# Patient Record
Sex: MALE | Race: WHITE | Employment: OTHER | URBAN - METROPOLITAN AREA
[De-identification: names, ages, dates, MRNs, and addresses within clinical notes are randomized per-mention and may not be internally consistent; named-entity substitution may affect disease eponyms.]

---

## 2024-07-12 ENCOUNTER — APPOINTMENT (OUTPATIENT)
Age: 69
DRG: 065 | End: 2024-07-12
Payer: MEDICARE

## 2024-07-12 ENCOUNTER — HOSPITAL ENCOUNTER (INPATIENT)
Age: 69
LOS: 1 days | Discharge: HOME | DRG: 065 | End: 2024-07-13
Attending: EMERGENCY MEDICINE | Admitting: INTERNAL MEDICINE
Payer: MEDICARE

## 2024-07-12 DIAGNOSIS — I63.9 CEREBROVASCULAR ACCIDENT (CVA), UNSPECIFIED MECHANISM (HCC): ICD-10-CM

## 2024-07-12 DIAGNOSIS — R42 DIZZINESS: ICD-10-CM

## 2024-07-12 DIAGNOSIS — I63.9 CEREBELLAR STROKE, ACUTE (HCC): Primary | ICD-10-CM

## 2024-07-12 LAB
ALBUMIN SERPL-MCNC: 4.6 G/DL (ref 3.4–5)
ALBUMIN/GLOB SERPL: 1.7 {RATIO}
ALP SERPL-CCNC: 79 U/L (ref 40–129)
ALT SERPL-CCNC: 16 U/L (ref 10–40)
ANION GAP SERPL CALCULATED.3IONS-SCNC: 11 MMOL/L (ref 3–16)
AST SERPL-CCNC: 24 U/L (ref 15–37)
BASOPHILS # BLD: 0.01 K/UL (ref 0–0.2)
BASOPHILS NFR BLD: 0 %
BILIRUB SERPL-MCNC: 0.3 MG/DL (ref 0–1)
BUN SERPL-MCNC: 17 MG/DL (ref 7–20)
CALCIUM SERPL-MCNC: 9.7 MG/DL (ref 8.3–10.6)
CHLORIDE SERPL-SCNC: 105 MMOL/L (ref 99–110)
CHOLEST SERPL-MCNC: 245 MG/DL (ref 0–199)
CO2 SERPL-SCNC: 25 MMOL/L (ref 21–32)
CREAT SERPL-MCNC: 1.1 MG/DL (ref 0.8–1.3)
ECHO AO ROOT DIAM: 3.5 CM
ECHO AO ROOT INDEX: 1.71 CM/M2
ECHO AV CUSP MM: 2.3 CM
ECHO AV MEAN GRADIENT: 4 MMHG
ECHO AV MEAN VELOCITY: 0.9 M/S
ECHO AV PEAK GRADIENT: 8 MMHG
ECHO AV PEAK VELOCITY: 1.4 M/S
ECHO AV VELOCITY RATIO: 0.71
ECHO AV VTI: 28.8 CM
ECHO BSA: 2.05 M2
ECHO BSA: 2.05 M2
ECHO EST RA PRESSURE: 3 MMHG
ECHO LA AREA 2C: 17.1 CM2
ECHO LA AREA 4C: 14 CM2
ECHO LA DIAMETER INDEX: 1.32 CM/M2
ECHO LA DIAMETER: 2.7 CM
ECHO LA MAJOR AXIS: 4.5 CM
ECHO LA MINOR AXIS: 5 CM
ECHO LA TO AORTIC ROOT RATIO: 0.77
ECHO LA VOL BP: 42 ML (ref 18–58)
ECHO LA VOL MOD A2C: 48 ML (ref 18–58)
ECHO LA VOL MOD A4C: 34 ML (ref 18–58)
ECHO LA VOL/BSA BIPLANE: 20 ML/M2 (ref 16–34)
ECHO LA VOLUME INDEX MOD A2C: 23 ML/M2 (ref 16–34)
ECHO LA VOLUME INDEX MOD A4C: 17 ML/M2 (ref 16–34)
ECHO LV E' LATERAL VELOCITY: 10 CM/S
ECHO LV E' SEPTAL VELOCITY: 7 CM/S
ECHO LV FRACTIONAL SHORTENING: 30 % (ref 28–44)
ECHO LV INTERNAL DIMENSION DIASTOLE INDEX: 2.29 CM/M2
ECHO LV INTERNAL DIMENSION DIASTOLIC: 4.7 CM (ref 4.2–5.9)
ECHO LV INTERNAL DIMENSION SYSTOLIC INDEX: 1.61 CM/M2
ECHO LV INTERNAL DIMENSION SYSTOLIC: 3.3 CM
ECHO LV IVSD: 0.9 CM (ref 0.6–1)
ECHO LV MASS 2D: 142.7 G (ref 88–224)
ECHO LV MASS INDEX 2D: 69.6 G/M2 (ref 49–115)
ECHO LV POSTERIOR WALL DIASTOLIC: 0.9 CM (ref 0.6–1)
ECHO LV RELATIVE WALL THICKNESS RATIO: 0.38
ECHO LVOT AV VTI INDEX: 0.92
ECHO LVOT MEAN GRADIENT: 2 MMHG
ECHO LVOT PEAK GRADIENT: 4 MMHG
ECHO LVOT PEAK VELOCITY: 1 M/S
ECHO LVOT VTI: 26.6 CM
ECHO MV A VELOCITY: 1.03 M/S
ECHO MV E DECELERATION TIME (DT): 222 MS
ECHO MV E VELOCITY: 0.68 M/S
ECHO MV E/A RATIO: 0.66
ECHO MV E/E' LATERAL: 6.8
ECHO MV E/E' RATIO (AVERAGED): 8.26
ECHO MV E/E' SEPTAL: 9.71
ECHO MV LVOT VTI INDEX: 1.02
ECHO MV MAX VELOCITY: 1 M/S
ECHO MV MEAN GRADIENT: 1 MMHG
ECHO MV MEAN VELOCITY: 0.5 M/S
ECHO MV PEAK GRADIENT: 4 MMHG
ECHO MV VTI: 27.2 CM
ECHO PULMONARY ARTERY END DIASTOLIC PRESSURE: 2 MMHG
ECHO PV MAX VELOCITY: 0.9 M/S
ECHO PV PEAK GRADIENT: 3 MMHG
ECHO PV REGURGITANT MAX VELOCITY: 0.7 M/S
ECHO PVEIN A DURATION: 103 MS
ECHO PVEIN A VELOCITY: 0.2 M/S
ECHO PVEIN PEAK D VELOCITY: 0.4 M/S
ECHO PVEIN PEAK S VELOCITY: 0.6 M/S
ECHO PVEIN S/D RATIO: 1.5 NO UNITS
ECHO RA AREA 4C: 18.5 CM2
ECHO RA END SYSTOLIC VOLUME APICAL 4 CHAMBER INDEX BSA: 26 ML/M2
ECHO RA VOLUME: 54 ML
ECHO RV FREE WALL PEAK S': 13 CM/S
ECHO RV INTERNAL DIMENSION: 2.1 CM
EKG ATRIAL RATE: 79 BPM
EKG DIAGNOSIS: NORMAL
EKG P AXIS: 84 DEGREES
EKG P-R INTERVAL: 152 MS
EKG Q-T INTERVAL: 398 MS
EKG QRS DURATION: 98 MS
EKG QTC CALCULATION (BAZETT): 456 MS
EKG R AXIS: 85 DEGREES
EKG T AXIS: 53 DEGREES
EKG VENTRICULAR RATE: 79 BPM
EOSINOPHIL # BLD: 0.1 K/UL (ref 0–0.6)
EOSINOPHILS RELATIVE PERCENT: 1 %
ERYTHROCYTE [DISTWIDTH] IN BLOOD BY AUTOMATED COUNT: 13 % (ref 12.4–15.4)
GFR, ESTIMATED: 74 ML/MIN/1.73M2
GLUCOSE BLD-MCNC: 143 MG/DL (ref 70–99)
GLUCOSE SERPL-MCNC: 149 MG/DL (ref 70–99)
HCT VFR BLD AUTO: 44.7 % (ref 40.5–52.5)
HDLC SERPL-MCNC: 41 MG/DL (ref 40–60)
HGB BLD-MCNC: 15.5 G/DL (ref 13.5–17.5)
IMM GRANULOCYTES # BLD AUTO: 0.02 K/UL (ref 0–0.5)
IMM GRANULOCYTES NFR BLD: 0 %
INR PPP: 0.9 (ref 0.9–1.2)
LDLC SERPL CALC-MCNC: 166 MG/DL (ref 0–99)
LYMPHOCYTES NFR BLD: 1.69 K/UL (ref 1–5.1)
LYMPHOCYTES RELATIVE PERCENT: 17 %
MCH RBC QN AUTO: 30.4 PG (ref 26–34)
MCHC RBC AUTO-ENTMCNC: 34.7 G/DL (ref 31–36)
MCV RBC AUTO: 87.6 FL (ref 80–100)
MONOCYTES NFR BLD: 0.63 K/UL (ref 0–1.3)
MONOCYTES NFR BLD: 6 %
NEUTROPHILS NFR BLD: 76 %
NEUTS SEG NFR BLD: 7.79 K/UL (ref 1.7–7.7)
PLATELET # BLD AUTO: 290 K/UL (ref 135–450)
PMV BLD AUTO: 10.9 FL
POTASSIUM SERPL-SCNC: 3.8 MMOL/L (ref 3.5–5.1)
PROT SERPL-MCNC: 7.3 G/DL (ref 6.4–8.2)
PROTHROMBIN TIME: 12.6 SEC (ref 11.9–14.9)
RBC # BLD AUTO: 5.1 M/UL (ref 4.2–5.9)
SODIUM SERPL-SCNC: 141 MMOL/L (ref 136–145)
TRIGL SERPL-MCNC: 192 MG/DL (ref 0–149)
TROPONIN I SERPL HS-MCNC: 7 NG/L (ref 0–22)
TROPONIN I SERPL HS-MCNC: 8 NG/L (ref 0–22)
VLDLC SERPL CALC-MCNC: 38 MG/DL
WBC OTHER # BLD: 10.2 K/UL (ref 4–11)

## 2024-07-12 PROCEDURE — 70551 MRI BRAIN STEM W/O DYE: CPT

## 2024-07-12 PROCEDURE — 70496 CT ANGIOGRAPHY HEAD: CPT

## 2024-07-12 PROCEDURE — 6370000000 HC RX 637 (ALT 250 FOR IP): Performed by: NURSE PRACTITIONER

## 2024-07-12 PROCEDURE — 85610 PROTHROMBIN TIME: CPT

## 2024-07-12 PROCEDURE — 93970 EXTREMITY STUDY: CPT

## 2024-07-12 PROCEDURE — 97165 OT EVAL LOW COMPLEX 30 MIN: CPT

## 2024-07-12 PROCEDURE — 96375 TX/PRO/DX INJ NEW DRUG ADDON: CPT

## 2024-07-12 PROCEDURE — 6360000004 HC RX CONTRAST MEDICATION: Performed by: EMERGENCY MEDICINE

## 2024-07-12 PROCEDURE — 6370000000 HC RX 637 (ALT 250 FOR IP): Performed by: EMERGENCY MEDICINE

## 2024-07-12 PROCEDURE — 92523 SPEECH SOUND LANG COMPREHEN: CPT

## 2024-07-12 PROCEDURE — 97162 PT EVAL MOD COMPLEX 30 MIN: CPT

## 2024-07-12 PROCEDURE — 93306 TTE W/DOPPLER COMPLETE: CPT

## 2024-07-12 PROCEDURE — 70450 CT HEAD/BRAIN W/O DYE: CPT

## 2024-07-12 PROCEDURE — 2060000000 HC ICU INTERMEDIATE R&B

## 2024-07-12 PROCEDURE — 96374 THER/PROPH/DIAG INJ IV PUSH: CPT

## 2024-07-12 PROCEDURE — 93306 TTE W/DOPPLER COMPLETE: CPT | Performed by: INTERNAL MEDICINE

## 2024-07-12 PROCEDURE — 99285 EMERGENCY DEPT VISIT HI MDM: CPT

## 2024-07-12 PROCEDURE — 82962 GLUCOSE BLOOD TEST: CPT

## 2024-07-12 PROCEDURE — 84484 ASSAY OF TROPONIN QUANT: CPT

## 2024-07-12 PROCEDURE — 71045 X-RAY EXAM CHEST 1 VIEW: CPT

## 2024-07-12 PROCEDURE — 80053 COMPREHEN METABOLIC PANEL: CPT

## 2024-07-12 PROCEDURE — 85025 COMPLETE CBC W/AUTO DIFF WBC: CPT

## 2024-07-12 PROCEDURE — 6360000002 HC RX W HCPCS: Performed by: EMERGENCY MEDICINE

## 2024-07-12 PROCEDURE — 93005 ELECTROCARDIOGRAM TRACING: CPT | Performed by: EMERGENCY MEDICINE

## 2024-07-12 PROCEDURE — 2580000003 HC RX 258: Performed by: EMERGENCY MEDICINE

## 2024-07-12 PROCEDURE — 83036 HEMOGLOBIN GLYCOSYLATED A1C: CPT

## 2024-07-12 PROCEDURE — 93970 EXTREMITY STUDY: CPT | Performed by: INTERNAL MEDICINE

## 2024-07-12 PROCEDURE — 80061 LIPID PANEL: CPT

## 2024-07-12 PROCEDURE — 99222 1ST HOSP IP/OBS MODERATE 55: CPT | Performed by: INTERNAL MEDICINE

## 2024-07-12 RX ORDER — ASPIRIN 81 MG/1
81 TABLET, CHEWABLE ORAL DAILY
Status: DISCONTINUED | OUTPATIENT
Start: 2024-07-13 | End: 2024-07-13 | Stop reason: HOSPADM

## 2024-07-12 RX ORDER — SODIUM CHLORIDE 9 MG/ML
INJECTION, SOLUTION INTRAVENOUS PRN
Status: DISCONTINUED | OUTPATIENT
Start: 2024-07-12 | End: 2024-07-13 | Stop reason: HOSPADM

## 2024-07-12 RX ORDER — LABETALOL HYDROCHLORIDE 5 MG/ML
10 INJECTION, SOLUTION INTRAVENOUS EVERY 4 HOURS PRN
Status: DISCONTINUED | OUTPATIENT
Start: 2024-07-12 | End: 2024-07-13 | Stop reason: HOSPADM

## 2024-07-12 RX ORDER — 0.9 % SODIUM CHLORIDE 0.9 %
1000 INTRAVENOUS SOLUTION INTRAVENOUS ONCE
Status: COMPLETED | OUTPATIENT
Start: 2024-07-12 | End: 2024-07-12

## 2024-07-12 RX ORDER — MECLIZINE HCL 12.5 MG/1
25 TABLET ORAL ONCE
Status: DISCONTINUED | OUTPATIENT
Start: 2024-07-12 | End: 2024-07-13 | Stop reason: HOSPADM

## 2024-07-12 RX ORDER — ONDANSETRON 4 MG/1
4 TABLET, ORALLY DISINTEGRATING ORAL EVERY 8 HOURS PRN
Status: DISCONTINUED | OUTPATIENT
Start: 2024-07-12 | End: 2024-07-13 | Stop reason: HOSPADM

## 2024-07-12 RX ORDER — LABETALOL HYDROCHLORIDE 5 MG/ML
10 INJECTION, SOLUTION INTRAVENOUS EVERY 4 HOURS PRN
Status: DISCONTINUED | OUTPATIENT
Start: 2024-07-12 | End: 2024-07-12

## 2024-07-12 RX ORDER — SODIUM CHLORIDE 0.9 % (FLUSH) 0.9 %
5-40 SYRINGE (ML) INJECTION PRN
Status: DISCONTINUED | OUTPATIENT
Start: 2024-07-12 | End: 2024-07-13 | Stop reason: HOSPADM

## 2024-07-12 RX ORDER — ASPIRIN 81 MG/1
324 TABLET, CHEWABLE ORAL ONCE
Status: COMPLETED | OUTPATIENT
Start: 2024-07-12 | End: 2024-07-12

## 2024-07-12 RX ORDER — LORAZEPAM 2 MG/ML
2 INJECTION INTRAMUSCULAR ONCE
Status: COMPLETED | OUTPATIENT
Start: 2024-07-12 | End: 2024-07-12

## 2024-07-12 RX ORDER — ROSUVASTATIN CALCIUM 20 MG/1
20 TABLET, COATED ORAL NIGHTLY
Status: DISCONTINUED | OUTPATIENT
Start: 2024-07-12 | End: 2024-07-12

## 2024-07-12 RX ORDER — ENOXAPARIN SODIUM 100 MG/ML
40 INJECTION SUBCUTANEOUS DAILY
Status: DISCONTINUED | OUTPATIENT
Start: 2024-07-12 | End: 2024-07-13 | Stop reason: HOSPADM

## 2024-07-12 RX ORDER — ONDANSETRON 2 MG/ML
4 INJECTION INTRAMUSCULAR; INTRAVENOUS EVERY 6 HOURS PRN
Status: DISCONTINUED | OUTPATIENT
Start: 2024-07-12 | End: 2024-07-13 | Stop reason: HOSPADM

## 2024-07-12 RX ORDER — SODIUM CHLORIDE 0.9 % (FLUSH) 0.9 %
5-40 SYRINGE (ML) INJECTION EVERY 12 HOURS SCHEDULED
Status: DISCONTINUED | OUTPATIENT
Start: 2024-07-12 | End: 2024-07-13 | Stop reason: HOSPADM

## 2024-07-12 RX ORDER — POLYETHYLENE GLYCOL 3350 17 G/17G
17 POWDER, FOR SOLUTION ORAL DAILY PRN
Status: DISCONTINUED | OUTPATIENT
Start: 2024-07-12 | End: 2024-07-13 | Stop reason: HOSPADM

## 2024-07-12 RX ORDER — CLOPIDOGREL BISULFATE 75 MG/1
75 TABLET ORAL DAILY
Status: DISCONTINUED | OUTPATIENT
Start: 2024-07-13 | End: 2024-07-13 | Stop reason: HOSPADM

## 2024-07-12 RX ORDER — ASPIRIN 300 MG/1
300 SUPPOSITORY RECTAL DAILY
Status: DISCONTINUED | OUTPATIENT
Start: 2024-07-13 | End: 2024-07-13 | Stop reason: HOSPADM

## 2024-07-12 RX ORDER — ONDANSETRON 2 MG/ML
4 INJECTION INTRAMUSCULAR; INTRAVENOUS
Status: DISCONTINUED | OUTPATIENT
Start: 2024-07-12 | End: 2024-07-12 | Stop reason: SDUPTHER

## 2024-07-12 RX ORDER — CLOPIDOGREL BISULFATE 75 MG/1
300 TABLET ORAL ONCE
Status: COMPLETED | OUTPATIENT
Start: 2024-07-12 | End: 2024-07-12

## 2024-07-12 RX ORDER — ATORVASTATIN CALCIUM 80 MG/1
80 TABLET, FILM COATED ORAL NIGHTLY
Status: CANCELLED | OUTPATIENT
Start: 2024-07-12

## 2024-07-12 RX ADMIN — ONDANSETRON 4 MG: 2 INJECTION INTRAMUSCULAR; INTRAVENOUS at 10:30

## 2024-07-12 RX ADMIN — ASPIRIN 81 MG 324 MG: 81 TABLET ORAL at 11:40

## 2024-07-12 RX ADMIN — SODIUM CHLORIDE 1000 ML: 9 INJECTION, SOLUTION INTRAVENOUS at 11:38

## 2024-07-12 RX ADMIN — IOPAMIDOL 75 ML: 755 INJECTION, SOLUTION INTRAVENOUS at 10:31

## 2024-07-12 RX ADMIN — CLOPIDOGREL BISULFATE 300 MG: 75 TABLET ORAL at 18:51

## 2024-07-12 RX ADMIN — LORAZEPAM 2 MG: 2 INJECTION INTRAMUSCULAR; INTRAVENOUS at 10:48

## 2024-07-12 ASSESSMENT — LIFESTYLE VARIABLES
HOW MANY STANDARD DRINKS CONTAINING ALCOHOL DO YOU HAVE ON A TYPICAL DAY: PATIENT DOES NOT DRINK
HOW OFTEN DO YOU HAVE A DRINK CONTAINING ALCOHOL: NEVER

## 2024-07-12 ASSESSMENT — PAIN - FUNCTIONAL ASSESSMENT: PAIN_FUNCTIONAL_ASSESSMENT: NONE - DENIES PAIN

## 2024-07-12 ASSESSMENT — ENCOUNTER SYMPTOMS
COUGH: 0
SHORTNESS OF BREATH: 0
NAUSEA: 1

## 2024-07-12 NOTE — ED PROVIDER NOTES
are listed above in the medical decision making.  If this was a shared visit with an FLYNN, the time in this attestation is non-concurrent critical care time out of the total shared critical care time provided by the FLYNN and myself.    DISCLAIMER: This chart was created using Dragon dictation software.  Efforts were made by me to ensure accuracy, however some errors may be present due to limitations of this technology and occasionally words are not transcribed correctly.        Calos Hernández MD  07/12/24 3204

## 2024-07-12 NOTE — ED NOTES
Pt unable to lay for MRI, pt feeling too anxious. Dr. Hernández aware. 2mg Ativan IV to be ordered.

## 2024-07-12 NOTE — PROGRESS NOTES
[facility] - Rehabilitation Department      Physical Therapy  3214/3214-01  [facility] 3 PROGRESSIVE CARE    [x] Initial Evaluation            [] Daily Treatment Note         [x] Discharge Summary      Patient: Sarkis Mosley   : 1955   MRN: [id number]   Date of Service:  2024   Admitting Diagnosis: Acute cerebrovascular accident (CVA) (HCC)  Ordering Physician: Sunitha Mitchell APRN - CNP   Current Admission Summary:   H&P Sunitha Mitchell APRN - CNP :   \"69-year-old male presented to [facility] emergency room with complaint of acute vertigo type dizziness, sensation of the room spinning, nausea, sensation of imbalance that started around 7:15 AM.  Patient states when he woke up he sat to side of bed and did some exercises states he laid on the bed and did some more exercises and when he sat back up again he became very dizzy and ill-equipped the room was spinning.  Patient did note some tingling in bilateral hands but denies any focal weaknesses in extremities.  Wife is at bedside who is a nurse who has worked in neurology and states patient had no slurring of speech she did not note any focal weaknesses at time of episode.  Patient was very nauseous and dry heaving.  Patient does have a history of high cholesterol he is unable to tolerate statins states the muscle weakness was so bad he was out walking on a hike and was unable to get out of the woods because he could not walk.  Has not tried alternative medications such as coenzyme or fish oils.  Patient denies any known history of atrial fibrillation no significant cardiac history patient states he had a stress test in the past believes there was no abnormal findings.  He does not take any medications on a regular basis.  Denies any alcohol or drug use.  Patient was he traveled from [place] and an 8-hour drive to visit daughter.  Patient denies any leg swelling or leg pain.  Patient has been able to ambulate independently.  He denies  imbalance.  []2 Mild Impairment:  Is able to step over the box, but must slow down and adjust steps to clear box safely.  []1 Moderate Impairment:  Is able to step over box but must stop, then step over, may require verbal cueing.  []0 Severe Impairment:  Cannot perform activity without assistance.    Step around obstacles:  Instructions:  Begin walking at normal speed.  When you come to the first cone (about 6’ away), walk around the right side of it.  When you come to the second cone (6’ past first cone), walk around it to the left.  Grading:  Osiel the lowest category that applies    [x]3 Normal:  Is able to walk around cones safely without changing gait     speed; no evidence of imbalance.   []2 Mild Impairment:  Is able to step around both cones, but must slow down and adjust steps to clear cones.  []1 Moderate Impairment:  Is able to clear cones, but must significantly slow speed to accomplish task, or requires verbal cueing.  []0 Severe Impairment:  Unable to clear cones, walks into one or both cones, or requires physical assistance.    Steps:  Instructions:  Walk up these stairs as you would at home, i.e., using the railing if necessary.  At the top, turn around and walk down.   Grading:  Osiel the lowest category that applies    []3 Normal:  Alternating feet, no rail.   [x]2 Mild Impairment:  Alternating feet, must use rail.  []1 Moderate Impairment:  Two feet to a stair, must use rail.  []0 Severe Impairment:  Cannot perform safely.      TOTAL SCORE:   23 /24    Scoring Information:      21/24 or above = minimal to no risk for falls   Below 21 indicates risk for falls and the lower the score the more the risk   Common score for moderate stage Parkinson Disease = 9-11/24  (Adapted from [employer]:  Theory & Practical Applications, 1995) Stair Mobility:  Number of Steps: 12 with rail alternating pattern CAROLINE  Comments:         Balance  Static Sitting:   Pt requires Independent to

## 2024-07-12 NOTE — PLAN OF CARE
Problem: SLP Adult - Disturbed Thought Process  Goal: By Discharge: Demonstrates cognitive skills at highest level of function for planned discharge setting.   See evaluation for individualized goals.  Outcome: Progressing     Cahtryn Lacy M.A., CCC-SLP  Speech-Language Pathologist  [phone]

## 2024-07-12 NOTE — CONSULTS
[facility]  H+P  Consult  OP Visit  FU Visit   CC/HPI   CC New patient visit for PFO.   HPI 69 y.o., male admitted with complaints of dizziness and imbalance.  Found to have cerebellar strokes.  Echo with PFO.     Cardiac Hx None   EKG NSR   Troponin Lab Results   Component Value Date    TROPHS 7 07/12/2024    TROPHS 8 07/12/2024      HISTORY/ALLERGY/ROS   MEDHx  has a past medical history of Hyperlipidemia.   SURGHx  has a past surgical history that includes hernia repair and back surgery.   SOCHx  reports that he has never smoked. He has never used smokeless tobacco. He reports that he does not currently use alcohol. He reports that he does not use drugs.   FAMHx family history includes Cancer in his mother; Heart Disease in his father and mother; Stomach Cancer in his father.   ALLERG Statins   ROS Full ROS obtained and negative except as mentioned in HPI   MEDICATIONS   Medications reviewed in Epic.   SCRIBE   GENET Bryson I, Luna Ledesma RN, am scribing for and in the presence of Eyad Rubi MD. 7/12/2024 3:30 PM EDT   MD Adelaide Ledesma is working as scribe for and in presence of me and may have prepopulated components of note with my historical intellectual property (IP) under my supervision.  Any additions to this IP performed in my presence and at my direction. Content and accuracy of this note reviewed by me (Eyad Rubi MD).   PHYSICAL EXAM   Vitals /73   Pulse 69   Temp 97 °F (36.1 °C)   Resp 17   Ht 1.829 m (6')   Wt 82.6 kg (182 lb)   SpO2 99%   BMI 24.68 kg/m²    Gen Alert, coop, no distress Heart  RRR, no MRG   Head NC, AT, no abnorm Abd  Soft, NT, +BS, no mass, no OM   Eyes PER, conj/corn clear Ext  Ext nl, AT, no C/C/E   Nose Nares nl, no drain, NT Pulse 2+ and symmetric   Throat Lips, mucosa, tongue nl Skin Col/text/turg nl, no vis rash/les   Neck S/S, TM, NT, no bruit/JVD Psych Nl mood and affect   Lung CTA-B, unlabored, no DTP Lymph   No cervical or axillary LA   Ch wall

## 2024-07-12 NOTE — PROGRESS NOTES
[facility] - Rehabilitation Department       Occupational Therapy  3214/3214-01  [facility] 3 PROGRESSIVE CARE    [x] Initial Evaluation            [x] Daily Treatment Note         [x] Discharge Summary      Patient: Sarkis Mosley   : 1955   MRN: [id number]   Date of Service:  2024    Admitting Diagnosis:  Acute cerebrovascular accident (CVA) (HCC)  Referring Physician: Sunitha Mitchell APRN - CNP   Current Admission Summary: H&P Sunitha Mitchell APRN - CNP :   \"69-year-old male presented to [facility] emergency room with complaint of acute vertigo type dizziness, sensation of the room spinning, nausea, sensation of imbalance that started around 7:15 AM.  Patient states when he woke up he sat to side of bed and did some exercises states he laid on the bed and did some more exercises and when he sat back up again he became very dizzy and ill-equipped the room was spinning.  Patient did note some tingling in bilateral hands but denies any focal weaknesses in extremities.  Wife is at bedside who is a nurse who has worked in neurology and states patient had no slurring of speech she did not note any focal weaknesses at time of episode.  Patient was very nauseous and dry heaving.  Patient does have a history of high cholesterol he is unable to tolerate statins states the muscle weakness was so bad he was out walking on a hike and was unable to get out of the woods because he could not walk.  Has not tried alternative medications such as coenzyme or fish oils.  Patient denies any known history of atrial fibrillation no significant cardiac history patient states he had a stress test in the past believes there was no abnormal findings.  He does not take any medications on a regular basis.  Denies any alcohol or drug use.  Patient was he traveled from [place] and an 8-hour drive to visit daughter.  Patient denies any leg swelling or leg pain.  Patient has been able to ambulate independently.  He

## 2024-07-12 NOTE — PROGRESS NOTES
Pt admitted, assessment complete, see flow sheets    Bed side swallow evaluation completed, pt passed with thin liquids, applesauce and crackers.  No coughing, no choking, mouth clear after bites.  Pt possess dexterity to feed self.

## 2024-07-12 NOTE — ED NOTES
ED TO INPATIENT SBAR HANDOFF    Patient Name: Sarkis Mosley   :  1955  69 y.o.   MRN:  [id number]  Preferred Name  [facility]  ED Room #:    Family/Caregiver Present yes - wife, Maribel  Restraints no   Sitter no   Sepsis Risk Score      Situation  Code Status: No Order No additional code details.    Allergies: Patient has no known allergies.  Weight: Patient Vitals for the past 96 hrs (Last 3 readings):   Weight   24 1013 82.6 kg (182 lb 1.6 oz)     Arrived from: home  Chief Complaint:   Chief Complaint   Patient presents with    Dizziness     Hospital Problem/Diagnosis:  Principal Problem:    Acute cerebrovascular accident (CVA) (HCC)  Resolved Problems:    * No resolved hospital problems. *    Imaging:   XR CHEST PORTABLE   Final Result   Impression:      No acute cardiopulmonary abnormality.      Electronically signed by Nii Montalvo MD      MRI BRAIN WO CONTRAST   Final Result   Impression:       Small acute strokes in the bilateral cerebellar hemispheres.      Dr. Montalvo discussed findings with Dr. Hernández via telephone at the following date and time: 2024 11:52 EDT.      Electronically signed by Nii Montalvo MD      CTA HEAD NECK W CONTRAST   Final Result      CTA Head:   No flow-limiting stenosis or large vessel occlusion.      CTA Neck:   No flow-limiting stenosis.      Electronically signed by Nii Montalvo MD      CT HEAD WO CONTRAST   Final Result   Impression:      No acute intracranial abnormality. Specifically, no evidence of intracranial hemorrhage.      Small region of encephalomalacia in the anterior inferior right frontal lobe may represent site of old infarction.      Dr. Montalvo discussed findings with Dr. Hernández via telephone at the following date and time: 2024 10:42 EDT.      Electronically signed by Nii Montalvo MD        Abnormal labs:   Abnormal Labs Reviewed   CBC WITH AUTO DIFFERENTIAL - Abnormal; Notable for the following components:       Result Value

## 2024-07-12 NOTE — H&P
History and Physical      Name:  Sarkis Mosley /Age/Sex: 1955  (69 y.o. male)   MRN & CSN:  [id number] & [id number] Admission Date/Time: 2024 10:15 AM   Location:   PCP: No primary care provider on file.       Hospital Day: 1     Attending physician: Dr. Stefan Kim        History of Present Illness:     Chief Complaint: Acute cerebrovascular accident (CVA) (HCC)  Sarkis Mosley is a 69 y.o.  male, with past medical history significant for hyperlipidemia, who presented to the emergency room with complaint of dizziness, nausea, sensation of imbalance. The present condition started 7:15 AM      Hospital Course    69-year-old male presented to [facility] emergency room with complaint of acute vertigo type dizziness, sensation of the room spinning, nausea, sensation of imbalance that started around 7:15 AM.  Patient states when he woke up he sat to side of bed and did some exercises states he laid on the bed and did some more exercises and when he sat back up again he became very dizzy and ill-equipped the room was spinning.  Patient did note some tingling in bilateral hands but denies any focal weaknesses in extremities.  Wife is at bedside who is a nurse who has worked in neurology and states patient had no slurring of speech she did not note any focal weaknesses at time of episode.  Patient was very nauseous and dry heaving.  Patient does have a history of high cholesterol he is unable to tolerate statins states the muscle weakness was so bad he was out walking on a hike and was unable to get out of the woods because he could not walk.  Has not tried alternative medications such as coenzyme or fish oils.  Patient denies any known history of atrial fibrillation no significant cardiac history patient states he had a stress test in the past believes there was no abnormal findings.  He does not take any medications on a regular basis.  Denies any alcohol or drug use.  Patient was he  appearance. There are multiple foci of true restricted diffusion in the bilateral cerebellar hemispheres there is associated ADC abnormality. Very subtle associated FLAIR hyperintensity with both regions. No significant abnormality on the GRE images to suggest intracranial hemorrhage.     Impression: Small acute strokes in the bilateral cerebellar hemispheres. Dr. Montalvo discussed findings with Dr. Hernández via telephone at the following date and time: 7/12/2024 11:52 EDT. Electronically signed by Nii Montalvo MD    CTA HEAD NECK W CONTRAST    Result Date: 7/12/2024  EXAM: CTA HEAD NECK W CONTRAST INDICATION: Stroke COMPARISON: None available TECHNIQUE: Standard CTA HEAD NECK W CONTRAST obtained with 3D reconstructions performed on a separate workstation under concurrent radiologist supervision. MIP, MPR, and VRT images were reconstructed in multiple planes and interpreted along with source images. Up-to-date CT equipment and radiation dose reduction techniques were employed. CONTRAST: 75 mL Isovue-370 FINDINGS: Aortic arch: Sequential 3 vessel branching pattern. No significant atherosclerotic calcification of the arch. Subclavian arteries: Normal Common carotid arteries: No significant atherosclerotic calcifications of the carotid bifurcations. Right cervical internal carotid artery: No significant stenosis of the proximal right cervical internal carotid by NASCET criteria. Right external carotid artery: Patent Left cervical internal carotid artery: No significant stenosis of the proximal left cervical internal carotid by NASCET criteria. Left external carotid artery: Patent Intracranial internal carotid arteries: No flow-limiting stenosis. Very mild atherosclerotic calcification. Anterior circulation: M1, A1, and their more distal segments are normal. Cervical vertebral arteries: Normal Intracranial vertebral arteries: Normal Basilar artery: Normal PICA, AICA, superior cerebellar arteries: Normal Posterior

## 2024-07-12 NOTE — PROGRESS NOTES
Speech Language Pathology  Speech/language/cognition evaluation  Facility/Department: [address] CARE    NAME:Sarkis Mosley  : 1955 (69 y.o.)   MRN: [id number]  ROOM: 3214/3214-01  ADMISSION DATE: 2024  PATIENT DIAGNOSIS(ES): Dizziness [R42]  Cerebellar stroke, acute (HCC) [I63.9]  Acute cerebrovascular accident (CVA) (HCC) [I63.9]  Cerebrovascular accident (CVA), unspecified mechanism (HCC) [I63.9]  Chief Complaint   Patient presents with    Dizziness     Patient Active Problem List    Diagnosis Date Noted    Acute cerebrovascular accident (CVA) (HCC) 2024     Past Medical History:   Diagnosis Date    Hyperlipidemia      Past Surgical History:   Procedure Laterality Date    BACK SURGERY      lumbar 4-5    HERNIA REPAIR       Allergies   Allergen Reactions    Statins      Rhabdo, muscle aches, has only taking Lipitor       DATE ONSET:  24    Date of Evaluation: 2024   Evaluating Therapist: GIANA HINOJOSA    Chart Reviewed: : [x] Yes [] No     Current Diet: ADULT DIET; Regular    Medical record review/interview: Per MD H&P on :   69 y.o. male  who presents to the ED complaining of waking up this morning at 7:15 AM with acute vertiginous type dizziness and some sensation of imbalance and nausea.  No vomiting.  No headaches.  No chest pain or shortness of breath.  He did travel here 8 hours by car (no airplane travel) yesterday to visit family, they are from [place].  The patient is here with his significant other who supplements the history.  The patient states he has no history of dizziness or vertigo.  He denies any numbness weakness or tingling in the arms or legs.  Denies any trouble with speech or swallowing.  His wife notes that he has been ambulatory independently including into the emergency department today.  The patient feels generally shaky.  No fevers cough or recent illnesses otherwise.  No belly pain or chest pain.  History of hyperlipidemia.  No other    WBC 10.2   HGB 15.5         BMP:  Recent Labs     07/12/24  1021      K 3.8      CO2 25   BUN 17   CREATININE 1.1   GLUCOSE 149*        Speech, Language, Cognitive Evaluation 7/12/24:  The MoCA ([place] Cognitive Assessment) was completed this date. Pt presents with overall mild cognitive impairment in the areas of calculations, abstract reasoning, and short term memory. Pt's wife at bedside and reports that the past couple weeks, she has noticed that pt had increased difficulty recalling information across a couple of days. Pt demonstrated strengths in the areas of confrontation naming, visuospatial / executive functioning (when provided extended time. Pt took 3 tries to complete cube drawing), and orientation . The MoCA was administered and the pt received a score of 17/30 (26/30 is considered WFL). The breakdown is below.  Visuospatial /executive: 4/5  Naming: 3/3  Attention:4/6 (calculations 1/3 points)  Language:1/3  Abstraction:1/2  Orientation: 5/6    Based on today's evaluation recommend speech therapy to target short term memory, to develop and implement compensatory strategies and to increase independence with complex IADLs.       Barriers to home discharge:   [x] inability to manage medications, will need assist/supervision- wife can assist.   [x] inability to manage finances, will need assist/supervision- wife can assist.   [] inability to effectively communicate in emergent situations (ex: calling 911, stating name, reduced intelligibility, etc)  [] severity of cognition (mild, mod, severe) with reduced insight negatively impacting safety/independence  [] inability to recall sternal precautions  [] inability to recall and adhere to safe swallow strategies placing pt at risk for aspiration  [] inability to adhere to diet recommendations regarding least restrictive diet  [] limited assistance at home upon discharge  [] severity of neglect (left/right)      Prognosis: Good      Speech

## 2024-07-13 ENCOUNTER — APPOINTMENT (OUTPATIENT)
Age: 69
DRG: 065 | End: 2024-07-13
Payer: MEDICARE

## 2024-07-13 VITALS
SYSTOLIC BLOOD PRESSURE: 129 MMHG | HEIGHT: 72 IN | DIASTOLIC BLOOD PRESSURE: 66 MMHG | RESPIRATION RATE: 16 BRPM | OXYGEN SATURATION: 96 % | WEIGHT: 182 LBS | BODY MASS INDEX: 24.65 KG/M2 | HEART RATE: 73 BPM | TEMPERATURE: 98.4 F

## 2024-07-13 LAB
ANION GAP SERPL CALCULATED.3IONS-SCNC: 12 MMOL/L (ref 3–16)
BUN SERPL-MCNC: 14 MG/DL (ref 7–20)
CALCIUM SERPL-MCNC: 9.6 MG/DL (ref 8.3–10.6)
CHLORIDE SERPL-SCNC: 101 MMOL/L (ref 99–110)
CO2 SERPL-SCNC: 25 MMOL/L (ref 21–32)
CREAT SERPL-MCNC: 1.2 MG/DL (ref 0.8–1.3)
ERYTHROCYTE [DISTWIDTH] IN BLOOD BY AUTOMATED COUNT: 13 % (ref 12.4–15.4)
EST. AVERAGE GLUCOSE BLD GHB EST-MCNC: 111 MG/DL
GFR, ESTIMATED: 66 ML/MIN/1.73M2
GLUCOSE SERPL-MCNC: 128 MG/DL (ref 70–99)
HBA1C MFR BLD: 5.5 %
HCT VFR BLD AUTO: 43.3 % (ref 40.5–52.5)
HGB BLD-MCNC: 14.7 G/DL (ref 13.5–17.5)
MCH RBC QN AUTO: 30.1 PG (ref 26–34)
MCHC RBC AUTO-ENTMCNC: 33.9 G/DL (ref 31–36)
MCV RBC AUTO: 88.7 FL (ref 80–100)
PLATELET # BLD AUTO: 276 K/UL (ref 135–450)
PMV BLD AUTO: 10.9 FL
POTASSIUM SERPL-SCNC: 3.9 MMOL/L (ref 3.5–5.1)
RBC # BLD AUTO: 4.88 M/UL (ref 4.2–5.9)
SODIUM SERPL-SCNC: 138 MMOL/L (ref 136–145)
WBC OTHER # BLD: 6.2 K/UL (ref 4–11)

## 2024-07-13 PROCEDURE — 80048 BASIC METABOLIC PNL TOTAL CA: CPT

## 2024-07-13 PROCEDURE — 6360000004 HC RX CONTRAST MEDICATION: Performed by: INTERNAL MEDICINE

## 2024-07-13 PROCEDURE — 99223 1ST HOSP IP/OBS HIGH 75: CPT | Performed by: PSYCHIATRY & NEUROLOGY

## 2024-07-13 PROCEDURE — 6370000000 HC RX 637 (ALT 250 FOR IP): Performed by: NURSE PRACTITIONER

## 2024-07-13 PROCEDURE — 2580000003 HC RX 258: Performed by: NURSE PRACTITIONER

## 2024-07-13 PROCEDURE — 71260 CT THORAX DX C+: CPT

## 2024-07-13 PROCEDURE — 85027 COMPLETE CBC AUTOMATED: CPT

## 2024-07-13 PROCEDURE — 2580000003 HC RX 258: Performed by: HOSPITALIST

## 2024-07-13 PROCEDURE — 36415 COLL VENOUS BLD VENIPUNCTURE: CPT

## 2024-07-13 RX ORDER — SODIUM CHLORIDE 9 MG/ML
INJECTION, SOLUTION INTRAVENOUS CONTINUOUS
Status: DISCONTINUED | OUTPATIENT
Start: 2024-07-13 | End: 2024-07-13 | Stop reason: HOSPADM

## 2024-07-13 RX ORDER — ASPIRIN 81 MG/1
81 TABLET, CHEWABLE ORAL DAILY
Qty: 30 TABLET | Refills: 0
Start: 2024-07-14

## 2024-07-13 RX ADMIN — SODIUM CHLORIDE: 9 INJECTION, SOLUTION INTRAVENOUS at 09:24

## 2024-07-13 RX ADMIN — IOPAMIDOL 75 ML: 755 INJECTION, SOLUTION INTRAVENOUS at 10:43

## 2024-07-13 RX ADMIN — SODIUM CHLORIDE, PRESERVATIVE FREE 10 ML: 5 INJECTION INTRAVENOUS at 09:18

## 2024-07-13 RX ADMIN — CLOPIDOGREL BISULFATE 75 MG: 75 TABLET ORAL at 09:18

## 2024-07-13 RX ADMIN — ASPIRIN 81 MG 81 MG: 81 TABLET ORAL at 09:18

## 2024-07-13 NOTE — PROGRESS NOTES
No acute events over night. Pt has been A&Ox4, able to make his needs known. NIHSS score continues to be 0.   VSS. Pt denies any further episodes of dizziness at this time. Gripper socks on for fall precautions. NSR on tele. Pt appears to have slept well.     Jese Daugherty RN

## 2024-07-13 NOTE — PLAN OF CARE
Problem: Discharge Planning  Goal: Discharge to home or other facility with appropriate resources  7/13/2024 1131 by Lubna Mauricio RN  Outcome: Progressing  7/12/2024 2344 by Willow Sawant RN  Outcome: Progressing     Problem: Safety - Adult  Goal: Free from fall injury  7/13/2024 1131 by Lubna Mauricio RN  Outcome: Progressing  7/12/2024 2344 by Willow Sawant RN  Outcome: Progressing     Problem: Chronic Conditions and Co-morbidities  Goal: Patient's chronic conditions and co-morbidity symptoms are monitored and maintained or improved  Outcome: Progressing

## 2024-07-13 NOTE — CONSULTS
In patient Neurology consult        [facility]      MD Sarkis Bradley  1955    Date of Service: 7/13/2024    Referring Physician: Donnie Adams MD      Reason for the consult and CC: Acute ischemic stroke    HPI:   The patient is a 69 y.o.  years old male with history of hyperlipidemia, originally from [place], visiting his family, who came to [facility] yesterday with acute dizziness and ataxia.  Symptoms started hours prior to admission.  Description difficulty with balance which was sudden and unsteadiness.  Degree was severe.  Duration was persistent.  No other relieving or aggravating factors.  Came to the ED.  Initial workup with CT and CT showed no LVO.  Further imaging with MRI showed bilateral ischemic cerebellar strokes.  He was admitted.  Today feels back to his baseline.  He was not taking any medicine at home.  He has some side effects allergic reaction statin in the past.  Further imaging with echo showed evidence of PFO.  CTA of the chest today showed no PE.  No family history of strokes.  No history of DVT or PE.  Non-smoker.  Other review of system was unremarkable.       Constitutional:   Vitals:    07/13/24 0440 07/13/24 0512 07/13/24 0718 07/13/24 1110   BP: 127/72  123/66 129/66   Pulse:  72 74 73   Resp:   16 16   Temp:   98.4 °F (36.9 °C) 98.4 °F (36.9 °C)   TempSrc:   Oral Oral   SpO2:   94% 96%   Weight:       Height:             I personally reviewed and updated social history, past medical history, medications, allergy, surgical history, and family history as documented in the patient's electronic health records.       ROS: 10-14 ROS reviewed with the patient/nurse/family which were unremarkable except mentioned in H&P.    General appearance:  Normal development and appear in no acute distress.   Mental Status:   Oriented to person, place, problem, and time.    Memory: Good immediate recall.  Intact remote memory  Normal attention span and

## 2024-07-13 NOTE — PROGRESS NOTES
Hospitalist Progress Note      PCP: No primary care provider on file.    Date of Admission: 7/12/2024    Chief Complaint: dizziness    Hospital Course: 69-year-old male presented to [facility] emergency room with complaint of acute vertigo type dizziness, sensation of the room spinning, nausea, sensation of imbalance that started around 7:15 AM.  Patient states when he woke up he sat to side of bed and did some exercises states he laid on the bed and did some more exercises and when he sat back up again he became very dizzy and ill-equipped the room was spinning.  Patient did note some tingling in bilateral hands but denies any focal weaknesses in extremities.  Wife is at bedside who is a nurse who has worked in neurology and states patient had no slurring of speech she did not note any focal weaknesses at time of episode.  Patient was very nauseous and dry heaving.  Patient does have a history of high cholesterol he is unable to tolerate statins states the muscle weakness was so bad he was out walking on a hike and was unable to get out of the woods because he could not walk.  Has not tried alternative medications such as coenzyme or fish oils.  Patient denies any known history of atrial fibrillation no significant cardiac history patient states he had a stress test in the past believes there was no abnormal findings.  He does not take any medications on a regular basis.  Denies any alcohol or drug use.  Patient was he traveled from [place] and an 8-hour drive to visit daughter.  Patient denies any leg swelling or leg pain.  Patient has been able to ambulate independently.  He denies any known history of vertigo in the past he does report his parents have a history of heart disease and his mother had bladder cancer his father had stomach cancer.  States he did have an uncle that has had a stroke.     On Examination,the patient is able to state onset of current symptoms.At the ED, vital signs;T 97.3  signed by Nii Montalvo MD      CT HEAD WO CONTRAST   Final Result   Impression:      No acute intracranial abnormality. Specifically, no evidence of intracranial hemorrhage.      Small region of encephalomalacia in the anterior inferior right frontal lobe may represent site of old infarction.      Dr. Montalvo discussed findings with Dr. Hernández via telephone at the following date and time: 7/12/2024 10:42 EDT.      Electronically signed by Nii Montalvo MD      CT CHEST PULMONARY EMBOLISM W CONTRAST    (Results Pending)             Sunitha Mitchell, APRN - CNP      NOTE:  This report was transcribed using voice recognition software.  Every effort was made to ensure accuracy; however, inadvertent computerized transcription errors may be present.

## 2024-07-13 NOTE — DISCHARGE INSTR - COC
Continuity of Care Form    Patient Name: Sarkis Mosley   :  1955  MRN:  [id number]    Admit date:  2024  Discharge date:  ***    Code Status Order: Full Code   Advance Directives:     Admitting Physician:  Stefan Kim MD  PCP: No primary care provider on file.    Discharging Nurse: ***  Discharging Hospital Unit/Room#: 3214/3214-01  Discharging Unit Phone Number: ***    Emergency Contact:   Extended Emergency Contact Information  Primary Emergency Contact: Maribel Mosley  Home Phone: [phone]  Relation: Spouse    Past Surgical History:  Past Surgical History:   Procedure Laterality Date    BACK SURGERY      lumbar 4-5    HERNIA REPAIR         Immunization History:     There is no immunization history on file for this patient.    Active Problems:  Patient Active Problem List   Diagnosis Code    Acute cerebrovascular accident (CVA) ([facility]) I63.9       Isolation/Infection:   Isolation            No Isolation          Patient Infection Status       None to display            Nurse Assessment:  Last Vital Signs: /66   Pulse 73   Temp 98.4 °F (36.9 °C) (Oral)   Resp 16   Ht 1.829 m (6')   Wt 82.6 kg (182 lb)   SpO2 96%   BMI 24.68 kg/m²     Last documented pain score (0-10 scale):    Last Weight:   Wt Readings from Last 1 Encounters:   24 82.6 kg (182 lb)     Mental Status:  {IP PT MENTAL STATUS:97314}    IV Access:  { ESTRELLA IV ACCESS:252298228}    Nursing Mobility/ADLs:  Walking   {CHP DME ADLs:238948856}  Transfer  {CHP DME ADLs:923226132}  Bathing  {CHP DME ADLs:205803756}  Dressing  {CHP DME ADLs:754252325}  Toileting  {CHP DME ADLs:988152632}  Feeding  {CHP DME ADLs:111826177}  Med Admin  {CHP DME ADLs:033271645}  Med Delivery   { ESTRELLA MED Delivery:988648621}    Wound Care Documentation and Therapy:        Elimination:  Continence:   Bowel: {YES / NO:}  Bladder: {YES / NO:}  Urinary Catheter: {Urinary Catheter:942081592}   Colostomy/Ileostomy/Ileal Conduit: {YES /  ***    Physician Certification: I certify the above information and transfer of Sarkis Mosley  is necessary for the continuing treatment of the diagnosis listed and that he requires {Admit to Appropriate Level of Care:09019} for {GREATER/LESS:348242572} 30 days.     Update Admission H&P: {CHP DME Changes in HandP:705683078}    PHYSICIAN SIGNATURE:  {Esignature:875776421}

## 2024-07-13 NOTE — DISCHARGE SUMMARY
Hospital Medicine Discharge Summary    Patient ID: Sarkis Mosley      Patient's PCP: No primary care provider on file.    Admit Date: 7/12/2024     Discharge Date:   ***    Admitting Physician: Stefan Kim MD     Discharge Physician: CLINT Disla - CNP     Discharge Diagnoses        Hospital Course: ***          Patient stated they felt well and wanted to go home.  Patient denied chest pain, shortness of breath, palpitations, abdominal pain, nausea vomiting, diarrhea, dysuria, headache lightheadedness or dizziness.  Appetite good.  Voiding without difficulty.  Reviewed plan of care with patient, verbalized understanding and agreement.  Denied further questions or needs.      Physical Exam Performed:     /66   Pulse 73   Temp 98.4 °F (36.9 °C) (Oral)   Resp 16   Ht 1.829 m (6')   Wt 82.6 kg (182 lb)   SpO2 96%   BMI 24.68 kg/m²       General appearance:  No apparent distress, appears stated age and cooperative.  HEENT:  Normal cephalic, atraumatic without obvious deformity. Pupils equal, round.  Extra ocular muscles intact. Conjunctivae/corneas clear.  Neck: Supple, with full range of motion. No jugular venous distention. Trachea midline.  Respiratory:  Normal respiratory effort. Clear to auscultation, bilaterally without Rales/Wheezes/Rhonchi.  Cardiovascular:  Regular rate and rhythm with normal S1/S2 without murmurs, rubs or gallops.  Abdomen: Soft, non-tender, non-distended with normal bowel sounds.  Musculoskeletal:  No clubbing, cyanosis or edema bilaterally.  Full range of motion without deformity.  Skin: Skin color, texture, turgor normal.  No rashes or lesions.  Neurologic:  Face symmetrical, speech clear, moves all 4 extremities, sensations are intact bilaterally   Psychiatric:  Alert and oriented, thought content appropriate, normal insight  Capillary Refill: Brisk,< 3 seconds   Peripheral Pulses: +2 palpable, equal bilaterally       Labs: For convenience and continuity at

## 2024-07-13 NOTE — DISCHARGE INSTRUCTIONS
Follow-up with your primary care provider when you return home  Establish care with a cardiologist for evaluation of PFO  Follow-up with neurology    Take 81 mg aspirin daily  Due to allergy to statins recommend alternatives omega-3 supplements for cholesterol.

## 2024-07-13 NOTE — FLOWSHEET NOTE
07/13/24 0718   Vital Signs   Temp 98.4 °F (36.9 °C)   Temp Source Oral   Pulse 74   Heart Rate Source Monitor   Respirations 16   /66   MAP (Calculated) 85   MAP (mmHg) 81   BP Location Left upper arm   BP Method Automatic   Patient Position Lying right side   Oxygen Therapy   SpO2 94 %   O2 Device None (Room air)     Patient assessment completed.  Meds given per MAR. No s/s of distress. No additional needs at this time.

## 2024-07-13 NOTE — PLAN OF CARE
Problem: Discharge Planning  Goal: Discharge to home or other facility with appropriate resources  7/13/2024 1358 by Lubna Mauricio RN  Outcome: Adequate for Discharge  7/13/2024 1131 by Lubna Mauricio RN  Outcome: Progressing     Problem: Safety - Adult  Goal: Free from fall injury  7/13/2024 1358 by Lubna Mauricio RN  Outcome: Adequate for Discharge  7/13/2024 1131 by Lubna Mauricio RN  Outcome: Progressing     Problem: Chronic Conditions and Co-morbidities  Goal: Patient's chronic conditions and co-morbidity symptoms are monitored and maintained or improved  7/13/2024 1358 by Lubna Mauricio RN  Outcome: Adequate for Discharge  7/13/2024 1131 by Lubna Mauricio RN  Outcome: Progressing

## 2024-07-13 NOTE — DISCHARGE INSTR - DIET

## 2024-07-13 NOTE — CARE COORDINATION
Discharge Note     Discharge order received.  This patient will discharge home with no needs.     Destination: Home      All case management needs met.      Comment: Pt d/c home.

## 2024-07-13 NOTE — FLOWSHEET NOTE
07/13/24 1110   Vital Signs   Temp 98.4 °F (36.9 °C)   Temp Source Oral   Pulse 73   Heart Rate Source Monitor   Respirations 16   /66   MAP (Calculated) 87   MAP (mmHg) 85   BP Location Left upper arm   BP Method Automatic   Patient Position High fowlers   Oxygen Therapy   SpO2 96 %   O2 Device None (Room air)     Patient assessment completed.  No s/s of distress. No additional needs at this time.

## 2024-07-14 NOTE — DISCHARGE SUMMARY
Discharge Summary    Name:  Sarkis Mosley /Age/Sex: 1955 (69 y.o. male)   Admit Date: 2024  Discharge Date: 24    MRN & CSN:  [id number] & [id number] Encounter Date and Time 24    Attending:  No att. providers found Discharging Provider: Donnie Adams MD     Hospital Course:   REASON FOR ADMISSION/CHIEF COMPLAINT:   Chief Complaint   Patient presents with   • Dizziness     Acute cerebrovascular accident (CVA) (H[facility])    PRINCIPAL DIAGNOSIS* AND HOSPITAL PROBLEM LIST:  Dizziness [R42]  Cerebellar stroke, acute (HCC) [I63.9]  Acute cerebrovascular accident (CVA) ([facility]CC) [I63.9]  Cerebrovascular accident (CVA), unspecified mechanism (HCC) [I63.9]    CONSULTS DURING THE ADMISSION:  IP CONSULT TO STROKE TEAM  IP CONSULT TO NEUROLOGY  IP CONSULT TO CARDIOLOGY  IP CONSULT TO CARDIOLOGY    BRIEF HPI & SUMMARY OF HOSPITAL COURSE:   69-year-old male with medical history including hyperlipidemia presented to [facility] emergency room with complaint of vertigo with sensation of the room spinning, nausea, sensation of imbalance.  Patient was found to have small acute stroke in bilateral cerebral hemispheres on MRI of brain.  Echo showed PFO and right heart strain.  CT PE protocol with no acute pulmonary embolism and bilateral lower extremity venous Doppler without any DVT.  Followed by cardiology and neurology team.  Started on aspirin per neurology team.  Patient has history of severe intolerance of several statins in past and patient refused to try another type of statin.  Cardiology team recommended Holter monitor and outpatient follow-up with cardiology team for further PFO treatment including possibility of closure.  Discussed with patient, patient  and his wife verbalized understanding.  Patient will follow-up with PCP and cardiology team as an outpatient in [place] where he is from.  On day of discharge, patient was feeling much better, felt had reached maximum benefit of this hospitalization,

## 2024-07-15 LAB
EKG ATRIAL RATE: 79 BPM
EKG DIAGNOSIS: NORMAL
EKG P AXIS: 84 DEGREES
EKG P-R INTERVAL: 152 MS
EKG Q-T INTERVAL: 398 MS
EKG QRS DURATION: 98 MS
EKG QTC CALCULATION (BAZETT): 456 MS
EKG R AXIS: 85 DEGREES
EKG T AXIS: 53 DEGREES
EKG VENTRICULAR RATE: 79 BPM